# Patient Record
Sex: FEMALE | Race: BLACK OR AFRICAN AMERICAN | NOT HISPANIC OR LATINO | Employment: FULL TIME | RURAL
[De-identification: names, ages, dates, MRNs, and addresses within clinical notes are randomized per-mention and may not be internally consistent; named-entity substitution may affect disease eponyms.]

---

## 2017-01-16 ENCOUNTER — HISTORICAL (OUTPATIENT)
Dept: ADMINISTRATIVE | Facility: HOSPITAL | Age: 42
End: 2017-01-16

## 2017-01-18 LAB
LAB AP CLINICAL INFORMATION: NORMAL
LAB AP GENERAL CAT - HISTORICAL: NORMAL
LAB AP INTERPRETATION/RESULT - HISTORICAL: NEGATIVE
LAB AP SPECIMEN ADEQUACY - HISTORICAL: NORMAL
LAB AP SPECIMEN SUBMITTED - HISTORICAL: NORMAL

## 2017-04-13 ENCOUNTER — HISTORICAL (OUTPATIENT)
Dept: ADMINISTRATIVE | Facility: HOSPITAL | Age: 42
End: 2017-04-13

## 2017-04-14 LAB
LAB AP CLINICAL INFORMATION: NORMAL
LAB AP DIAGNOSIS - HISTORICAL: NORMAL
LAB AP GROSS PATHOLOGY - HISTORICAL: NORMAL
LAB AP SPECIMEN SUBMITTED - HISTORICAL: NORMAL

## 2021-06-28 ENCOUNTER — OFFICE VISIT (OUTPATIENT)
Dept: FAMILY MEDICINE | Facility: CLINIC | Age: 46
End: 2021-06-28
Payer: COMMERCIAL

## 2021-06-28 VITALS
BODY MASS INDEX: 35.79 KG/M2 | WEIGHT: 228 LBS | OXYGEN SATURATION: 100 % | DIASTOLIC BLOOD PRESSURE: 105 MMHG | HEART RATE: 91 BPM | RESPIRATION RATE: 17 BRPM | TEMPERATURE: 98 F | HEIGHT: 67 IN | SYSTOLIC BLOOD PRESSURE: 147 MMHG

## 2021-06-28 DIAGNOSIS — J01.00 ACUTE NON-RECURRENT MAXILLARY SINUSITIS: ICD-10-CM

## 2021-06-28 DIAGNOSIS — J01.11 ACUTE RECURRENT FRONTAL SINUSITIS: Primary | ICD-10-CM

## 2021-06-28 PROCEDURE — 99212 PR OFFICE/OUTPT VISIT, EST, LEVL II, 10-19 MIN: ICD-10-PCS | Mod: 25,,, | Performed by: NURSE PRACTITIONER

## 2021-06-28 PROCEDURE — 96372 PR INJECTION,THERAP/PROPH/DIAG2ST, IM OR SUBCUT: ICD-10-PCS | Mod: ,,, | Performed by: NURSE PRACTITIONER

## 2021-06-28 PROCEDURE — 99051 MED SERV EVE/WKEND/HOLIDAY: CPT | Mod: ,,, | Performed by: NURSE PRACTITIONER

## 2021-06-28 PROCEDURE — 96372 THER/PROPH/DIAG INJ SC/IM: CPT | Mod: ,,, | Performed by: NURSE PRACTITIONER

## 2021-06-28 PROCEDURE — 99051 PR MEDICAL SERVICES, EVE/WKEND/HOLIDAY: ICD-10-PCS | Mod: ,,, | Performed by: NURSE PRACTITIONER

## 2021-06-28 PROCEDURE — 99212 OFFICE O/P EST SF 10 MIN: CPT | Mod: 25,,, | Performed by: NURSE PRACTITIONER

## 2021-06-28 RX ORDER — CARVEDILOL 12.5 MG/1
TABLET ORAL
COMMUNITY
Start: 2021-03-08

## 2021-06-28 RX ORDER — ASPIRIN 81 MG/1
TABLET ORAL
COMMUNITY

## 2021-06-28 RX ORDER — ROSUVASTATIN CALCIUM 20 MG/1
TABLET, COATED ORAL
COMMUNITY
Start: 2021-05-18

## 2021-06-28 RX ORDER — FLUCONAZOLE 150 MG/1
TABLET ORAL
Qty: 1 TABLET | Refills: 0 | Status: SHIPPED | OUTPATIENT
Start: 2021-06-28 | End: 2023-08-02 | Stop reason: ALTCHOICE

## 2021-06-28 RX ORDER — PROMETHAZINE HYDROCHLORIDE AND DEXTROMETHORPHAN HYDROBROMIDE 6.25; 15 MG/5ML; MG/5ML
5 SYRUP ORAL EVERY 4 HOURS PRN
Qty: 150 ML | Refills: 0 | Status: SHIPPED | OUTPATIENT
Start: 2021-06-28 | End: 2021-07-08

## 2021-06-28 RX ORDER — TRIAMTERENE AND HYDROCHLOROTHIAZIDE 75; 50 MG/1; MG/1
TABLET ORAL
COMMUNITY

## 2021-06-28 RX ORDER — LEVOTHYROXINE SODIUM 125 UG/1
137 TABLET ORAL DAILY
COMMUNITY
Start: 2021-06-28 | End: 2023-08-02

## 2021-06-28 RX ORDER — KETOROLAC TROMETHAMINE 30 MG/ML
60 INJECTION, SOLUTION INTRAMUSCULAR; INTRAVENOUS
Status: COMPLETED | OUTPATIENT
Start: 2021-06-28 | End: 2021-06-28

## 2021-06-28 RX ORDER — PREDNISONE 20 MG/1
TABLET ORAL
Qty: 9 TABLET | Refills: 0 | Status: SHIPPED | OUTPATIENT
Start: 2021-06-28 | End: 2021-07-04

## 2021-06-28 RX ORDER — AZITHROMYCIN 500 MG/1
500 TABLET, FILM COATED ORAL DAILY
Qty: 5 TABLET | Refills: 0 | Status: SHIPPED | OUTPATIENT
Start: 2021-06-28 | End: 2023-08-02

## 2021-06-28 RX ADMIN — KETOROLAC TROMETHAMINE 60 MG: 30 INJECTION, SOLUTION INTRAMUSCULAR; INTRAVENOUS at 07:06

## 2021-08-17 ENCOUNTER — OFFICE VISIT (OUTPATIENT)
Dept: SURGERY | Facility: CLINIC | Age: 46
End: 2021-08-17
Attending: SURGERY
Payer: COMMERCIAL

## 2021-08-17 VITALS — HEIGHT: 67 IN | BODY MASS INDEX: 40.81 KG/M2 | WEIGHT: 260 LBS

## 2021-08-17 DIAGNOSIS — R10.10 PAIN OF UPPER ABDOMEN: ICD-10-CM

## 2021-08-17 DIAGNOSIS — R10.32 LEFT LOWER QUADRANT ABDOMINAL PAIN: Primary | ICD-10-CM

## 2021-08-17 PROCEDURE — 99204 PR OFFICE/OUTPT VISIT, NEW, LEVL IV, 45-59 MIN: ICD-10-PCS | Mod: S$PBB,,, | Performed by: SURGERY

## 2021-08-17 PROCEDURE — 99204 OFFICE O/P NEW MOD 45 MIN: CPT | Mod: S$PBB,,, | Performed by: SURGERY

## 2021-08-17 PROCEDURE — 99214 OFFICE O/P EST MOD 30 MIN: CPT | Mod: PBBFAC | Performed by: SURGERY

## 2021-08-17 PROCEDURE — 1159F PR MEDICATION LIST DOCUMENTED IN MEDICAL RECORD: ICD-10-PCS | Mod: ,,, | Performed by: SURGERY

## 2021-08-17 PROCEDURE — 3008F PR BODY MASS INDEX (BMI) DOCUMENTED: ICD-10-PCS | Mod: ,,, | Performed by: SURGERY

## 2021-08-17 PROCEDURE — 3008F BODY MASS INDEX DOCD: CPT | Mod: ,,, | Performed by: SURGERY

## 2021-08-17 PROCEDURE — 1159F MED LIST DOCD IN RCRD: CPT | Mod: ,,, | Performed by: SURGERY

## 2021-08-19 PROBLEM — R10.10 PAIN OF UPPER ABDOMEN: Status: ACTIVE | Noted: 2021-08-19

## 2021-08-19 PROBLEM — R10.32 LEFT LOWER QUADRANT ABDOMINAL PAIN: Status: ACTIVE | Noted: 2021-08-19

## 2021-08-25 ENCOUNTER — HOSPITAL ENCOUNTER (OUTPATIENT)
Dept: RADIOLOGY | Facility: HOSPITAL | Age: 46
Discharge: HOME | End: 2021-08-25
Attending: SURGERY
Payer: COMMERCIAL

## 2021-08-25 DIAGNOSIS — R10.10 PAIN OF UPPER ABDOMEN: ICD-10-CM

## 2021-08-25 PROCEDURE — 74177 CT ABD & PELVIS W/CONTRAST: CPT | Mod: TC

## 2021-08-25 PROCEDURE — 74177 CT ABD & PELVIS W/CONTRAST: CPT | Mod: 26,,, | Performed by: RADIOLOGY

## 2021-08-25 PROCEDURE — 25500020 PHARM REV CODE 255: Performed by: SURGERY

## 2021-08-25 PROCEDURE — 74177 CT ABDOMEN PELVIS WITH CONTRAST: ICD-10-PCS | Mod: 26,,, | Performed by: RADIOLOGY

## 2021-08-25 RX ADMIN — IOPAMIDOL 100 ML: 755 INJECTION, SOLUTION INTRAVENOUS at 10:08

## 2021-09-07 ENCOUNTER — OFFICE VISIT (OUTPATIENT)
Dept: SURGERY | Facility: CLINIC | Age: 46
End: 2021-09-07
Attending: SURGERY
Payer: COMMERCIAL

## 2021-09-07 DIAGNOSIS — R10.9 ABDOMINAL PAIN, UNSPECIFIED ABDOMINAL LOCATION: Primary | ICD-10-CM

## 2021-09-07 DIAGNOSIS — R10.11 RIGHT UPPER QUADRANT ABDOMINAL PAIN: ICD-10-CM

## 2021-09-07 DIAGNOSIS — K40.90 INGUINAL HERNIA OF RIGHT SIDE WITHOUT OBSTRUCTION OR GANGRENE: ICD-10-CM

## 2021-09-07 PROCEDURE — 1159F PR MEDICATION LIST DOCUMENTED IN MEDICAL RECORD: ICD-10-PCS | Mod: ,,, | Performed by: SURGERY

## 2021-09-07 PROCEDURE — 99213 PR OFFICE/OUTPT VISIT, EST, LEVL III, 20-29 MIN: ICD-10-PCS | Mod: S$PBB,,, | Performed by: SURGERY

## 2021-09-07 PROCEDURE — 99213 OFFICE O/P EST LOW 20 MIN: CPT | Mod: S$PBB,,, | Performed by: SURGERY

## 2021-09-07 PROCEDURE — 99213 OFFICE O/P EST LOW 20 MIN: CPT | Mod: PBBFAC | Performed by: SURGERY

## 2021-09-07 PROCEDURE — 1159F MED LIST DOCD IN RCRD: CPT | Mod: ,,, | Performed by: SURGERY

## 2021-09-08 PROBLEM — R10.9 ABDOMINAL PAIN: Status: ACTIVE | Noted: 2021-09-08

## 2021-09-08 PROBLEM — R10.11 RIGHT UPPER QUADRANT ABDOMINAL PAIN: Status: ACTIVE | Noted: 2021-08-19

## 2021-09-08 PROBLEM — K40.90 INGUINAL HERNIA OF RIGHT SIDE WITHOUT OBSTRUCTION OR GANGRENE: Status: ACTIVE | Noted: 2021-09-08

## 2021-09-14 ENCOUNTER — HOSPITAL ENCOUNTER (EMERGENCY)
Facility: HOSPITAL | Age: 46
Discharge: HOME | End: 2021-09-14
Attending: FAMILY MEDICINE | Admitting: FAMILY MEDICINE
Payer: COMMERCIAL

## 2021-09-14 VITALS
HEIGHT: 67 IN | SYSTOLIC BLOOD PRESSURE: 145 MMHG | WEIGHT: 260 LBS | RESPIRATION RATE: 22 BRPM | TEMPERATURE: 99 F | HEART RATE: 101 BPM | BODY MASS INDEX: 40.81 KG/M2 | OXYGEN SATURATION: 98 % | DIASTOLIC BLOOD PRESSURE: 87 MMHG

## 2021-09-14 DIAGNOSIS — J06.9 UPPER RESPIRATORY TRACT INFECTION, UNSPECIFIED TYPE: Primary | ICD-10-CM

## 2021-09-14 DIAGNOSIS — R07.9 CHEST PAIN: ICD-10-CM

## 2021-09-14 PROCEDURE — 99284 EMERGENCY DEPT VISIT MOD MDM: CPT | Mod: 25

## 2021-09-14 PROCEDURE — 93010 ELECTROCARDIOGRAM REPORT: CPT | Mod: ,,, | Performed by: FAMILY MEDICINE

## 2021-09-14 PROCEDURE — 99283 EMERGENCY DEPT VISIT LOW MDM: CPT | Mod: ,,, | Performed by: FAMILY MEDICINE

## 2021-09-14 PROCEDURE — 63600175 PHARM REV CODE 636 W HCPCS: Performed by: FAMILY MEDICINE

## 2021-09-14 PROCEDURE — 99283 PR EMERGENCY DEPT VISIT,LEVEL III: ICD-10-PCS | Mod: ,,, | Performed by: FAMILY MEDICINE

## 2021-09-14 PROCEDURE — 93005 ELECTROCARDIOGRAM TRACING: CPT

## 2021-09-14 PROCEDURE — 96372 THER/PROPH/DIAG INJ SC/IM: CPT

## 2021-09-14 PROCEDURE — 93010 EKG 12-LEAD: ICD-10-PCS | Mod: ,,, | Performed by: FAMILY MEDICINE

## 2021-09-14 RX ORDER — HYDROXYZINE PAMOATE 25 MG/1
25 CAPSULE ORAL EVERY 8 HOURS PRN
Qty: 40 CAPSULE | Refills: 0 | Status: SHIPPED | OUTPATIENT
Start: 2021-09-14 | End: 2022-09-06

## 2021-09-14 RX ORDER — BENZONATATE 100 MG/1
100 CAPSULE ORAL 3 TIMES DAILY PRN
Qty: 20 CAPSULE | Refills: 0 | Status: SHIPPED | OUTPATIENT
Start: 2021-09-14 | End: 2021-09-24

## 2021-09-14 RX ORDER — LORAZEPAM 2 MG/ML
INJECTION INTRAMUSCULAR
Status: DISCONTINUED
Start: 2021-09-14 | End: 2021-09-14 | Stop reason: HOSPADM

## 2021-09-14 RX ORDER — LORAZEPAM 2 MG/ML
1 INJECTION INTRAMUSCULAR
Status: COMPLETED | OUTPATIENT
Start: 2021-09-14 | End: 2021-09-14

## 2021-09-14 RX ADMIN — LORAZEPAM 1 MG: 2 INJECTION INTRAMUSCULAR; INTRAVENOUS at 04:09

## 2021-09-15 ENCOUNTER — TELEPHONE (OUTPATIENT)
Dept: EMERGENCY MEDICINE | Facility: HOSPITAL | Age: 46
End: 2021-09-15

## 2022-04-21 DIAGNOSIS — M54.41 LOW BACK PAIN WITH RIGHT-SIDED SCIATICA, UNSPECIFIED BACK PAIN LATERALITY, UNSPECIFIED CHRONICITY: Primary | ICD-10-CM

## 2022-04-26 ENCOUNTER — CLINICAL SUPPORT (OUTPATIENT)
Dept: REHABILITATION | Facility: HOSPITAL | Age: 47
End: 2022-04-26
Payer: COMMERCIAL

## 2022-04-26 DIAGNOSIS — M54.6 CHRONIC RIGHT-SIDED THORACIC BACK PAIN: Primary | ICD-10-CM

## 2022-04-26 DIAGNOSIS — M54.41 LOW BACK PAIN WITH RIGHT-SIDED SCIATICA, UNSPECIFIED BACK PAIN LATERALITY, UNSPECIFIED CHRONICITY: ICD-10-CM

## 2022-04-26 DIAGNOSIS — G89.29 CHRONIC RIGHT-SIDED THORACIC BACK PAIN: Primary | ICD-10-CM

## 2022-04-26 PROCEDURE — 97161 PT EVAL LOW COMPLEX 20 MIN: CPT

## 2022-04-26 NOTE — PLAN OF CARE
[facility]   Physical Therapy Initial Evaluation    Name: Rox Comer  Clinic Number: [id number]    Therapy Diagnosis:   Encounter Diagnoses   Name Primary?    Low back pain with right-sided pain     Chronic right-sided thoracic back pain Yes     Physician: Augusto Jung DO    Physician Orders: PT Eval and Treat   Medical Diagnosis from Referral: Low back pain with right-sided pain  Evaluation Date: 4/26/2022  Plan of Care Expiration: 5/27/22  Visit # / Visits authorized: 1/ 12    Time In: 1625  Time Out: 1645    Precautions: Standard    Subjective   Date of onset: January 2022  History of current condition - Kerwin reports: having some right side back pain in January following a session of golf practice. The pain progressively got worse and she finally had to see a doctor about it in February and then returned in March because the pain has not resolved. Patient is taking Zanaflex for muscle relaxer. Dr. Jung referred patient to therapy because the pain has become chronic and unresolved.     Medical History:   Past Medical History:   Diagnosis Date    Hypertension     Hyperthyroidism        Surgical History:   Rox Comer  has a past surgical history that includes Hernia repair and Thyroid surgery (09/2020).    Medications:   Rox has a current medication list which includes the following prescription(s): aspirin, azithromycin, carvedilol, fluconazole, hydroxyzine pamoate, levothyroxine, rosuvastatin, and triamterene-hydrochlorothiazide 75-50 mg.    Allergies:   Review of patient's allergies indicates:   Allergen Reactions    Cephalexin monohydrate Rash        Imaging, none available:     Prior Therapy: no  Social History:  lives with their spouse  Occupation:   Prior Level of Function: independent  Current Level of Function: independent    Pain:  Current 3/10, worst 10/10, best 3/10   Location: right thoracic back   Description: Aching  Aggravating Factors:  laying to sitting position  Easing Factors: muscle relaxers    Pts goals: for the pain to be gone.      Objective     I. Supine Tests:    LLD Neg right Neg left       Special test Right  Left    SLR test < 60 degrees Negative Negative   SLR test > 60 degrees Negative Negative   Piriformis test Negative Negative   ELVIN test Negative Negative   SI distraction Negative Negative   SI compression Negative Negative          MANUAL MUSCLE TEST  Right left   Hip flexion MMT number: 5/5 MMT strength: 5/5   Hip abduction MMT strength: 5/5 MMT strength: 5/5   Knee extension MMT strength: 5/5 MMT strength: 5/5   Knee flexion  MMT strength: 5/5 MMT strength: 5/5   Ankle dorsiflexion MMT strength: 5/5 MMT strength: 5/5   Ankle plantar flexion  MMT strength: 5/5 MMT strength: 5/5   Extensor hallucis longus MMT strength: 5/5 MMT strength: 5/5     ROM/flexibility right left   Hip flexion (120) WFL WFL   Internal rotation (45) WFL WFL   External rotation (45) WFL WFL   Hamstring 90/90 (-10) WFL WFL   Rectus femoris (120) WFL WFL               Abdominal MMT: 3-/5  Back/Thoaracid extensors MMT: 3-/5    I. Sitting Tests:    Palpation: tender to palpation right thoracic lateral and posterior intercostals    Sitting lordosis: WNL  Sitting slump test Negative RIGHT  Negative LEFT       III. Standing Tests:    Posture:  1. Standing lordosis: WNL  2. Scoliosis: no  3. Lateral shift: n0    SI forward bend Negative right Negative left     SPINE AROM:    Lumbar Flexion: WNL  Lumbar Extension: WNL  Lumbar Side Bending: right 10 left 25  Trunk rotation: right 40 left 60      IV. Gait assessment:     Step length:  WNL  Step width: WNL   Dorita:  WNL  Antalgic gait: no  Assistive device: none             Assessment     Pt prognosis is Good.     Pt will benefit from skilled outpatient Physical Therapy to address the deficits stated above and in the chart below, provide pt/family education, and to maximize pt's level of independence.     Plan of  care discussed with patient: Yes  Pt's spiritual, cultural and educational needs considered and patient is agreeable to the plan of care and goals as stated below:     Anticipated Barriers for therapy: NONE      Goals:  Short Term Goals: 2 weeks   Pt in agreement with goals to improve independent functional mobility and activity tolerance.    1. Patient will correctly demonstrate independent performance of Home Exercise Program in 1 week.  2. Patient will report decreased right thoracic intercostal pain to 5/10 for standing, walking and bending activities.      Long Term Goals: 4 weeks   1. Patient will increase bilateral hip LE strength and abdominal strength to 3+/5 to improve functional activity tolerance.  2. Patient will increase abdominal and back extensor strength to 4/5 to improve functional mobility for recreational activities.  3. Patient will tolerate 30 minutes or greater of therapeutic exercise with back pain no greater than 0/10 to improve functional activity tolerance.      Plan   Plan of care Certification: 4/26/2022 to 5/27/22.    Outpatient Physical Therapy 3 times weekly for 4 weeks to include the following interventions: Electrical Stimulation IFC, Manual Therapy, Moist Heat/ Ice, Patient Education and Therapeutic Exercise.     Supervised visit: Case conference with Essence Shaw PTA and POC reviewed.    Grant Rosales, PT      Physician Signature:________________________________________________      Date:____________________________________________________________

## 2022-09-06 ENCOUNTER — OFFICE VISIT (OUTPATIENT)
Dept: OBSTETRICS AND GYNECOLOGY | Facility: CLINIC | Age: 47
End: 2022-09-06
Payer: COMMERCIAL

## 2022-09-06 VITALS
HEIGHT: 67 IN | RESPIRATION RATE: 16 BRPM | SYSTOLIC BLOOD PRESSURE: 130 MMHG | BODY MASS INDEX: 42.85 KG/M2 | WEIGHT: 273 LBS | DIASTOLIC BLOOD PRESSURE: 80 MMHG

## 2022-09-06 DIAGNOSIS — Z01.419 NORMAL GYNECOLOGIC EXAMINATION: Primary | ICD-10-CM

## 2022-09-06 PROBLEM — M54.6 RIGHT-SIDED THORACIC BACK PAIN: Status: RESOLVED | Noted: 2022-04-26 | Resolved: 2022-09-06

## 2022-09-06 PROBLEM — R10.11 RIGHT UPPER QUADRANT ABDOMINAL PAIN: Status: RESOLVED | Noted: 2021-08-19 | Resolved: 2022-09-06

## 2022-09-06 PROBLEM — R10.32 LEFT LOWER QUADRANT ABDOMINAL PAIN: Status: RESOLVED | Noted: 2021-08-19 | Resolved: 2022-09-06

## 2022-09-06 PROBLEM — R10.9 ABDOMINAL PAIN: Status: RESOLVED | Noted: 2021-09-08 | Resolved: 2022-09-06

## 2022-09-06 PROCEDURE — 99213 OFFICE O/P EST LOW 20 MIN: CPT | Mod: PBBFAC | Performed by: ADVANCED PRACTICE MIDWIFE

## 2022-09-06 RX ORDER — VALSARTAN 80 MG/1
TABLET ORAL
COMMUNITY
Start: 2022-08-10

## 2022-09-06 RX ORDER — DICLOFENAC SODIUM 75 MG/1
TABLET, DELAYED RELEASE ORAL
COMMUNITY

## 2022-09-06 NOTE — PROGRESS NOTES
Subjective:      Rox Comer is a 47 y.o. female who presents for an annual exam.     She reports her periods are:  spotting in august,  light bleeding x 3 days approx every 3-4 months    Complains of vaginitis No    Hot Flashes: occasionally during the day, regular night sweats    Colonoscopy Due: n/a,  Last MMG 9/2021 @ [facility]    Last Dexa: N/A    Review of Systems  Pertinent items are noted in HPI.      Objective:      {exam; complete:01730}.           Assessment:     No diagnosis found.       Plan:       All questions answered.  Breast self exam technique reviewed and patient encouraged to perform self-exam monthly.  Discussed healthy lifestyle modifications.        No orders of the defined types were placed in this encounter.

## 2022-09-06 NOTE — PROGRESS NOTES
Subjective:       Patient ID: Rox Comer is a 47 y.o. female.    Chief Complaint: Well Woman (Last pap 2021) and Gynecologic Exam    Complains of vaginitis No    Hot Flashes: occasionally during the day, regular night sweats    Colonoscopy Due: n/a,  Last MMG 9/2021 @ [facility]      Gynecologic Exam  The patient's primary symptoms include pelvic pain (Occasional pain left inguinal area). She is not pregnant. She is sexually active. No, her partner does not have an STD. Her menstrual history has been irregular (Periods are approx every 3-4 months, light bleeding x 3 days, LMP August 2022).   Review of Systems   Constitutional: Negative.    HENT: Negative.     Eyes: Negative.    Respiratory: Negative.     Cardiovascular: Negative.    Gastrointestinal: Negative.    Endocrine: Negative.    Genitourinary:  Positive for pelvic pain (Occasional pain left inguinal area).   Musculoskeletal: Negative.    Integumentary:  Negative.   Neurological: Negative.    Psychiatric/Behavioral: Negative.         Objective:      Physical Exam  Vitals reviewed.   Constitutional:       Appearance: She is obese.   HENT:      Head: Normocephalic.   Cardiovascular:      Rate and Rhythm: Normal rate and regular rhythm.   Pulmonary:      Effort: Pulmonary effort is normal.      Breath sounds: Normal breath sounds.   Chest:   Breasts:     Breasts are symmetrical.      Right: Normal. No mass.      Left: Normal. No mass.   Abdominal:      General: Abdomen is flat.      Palpations: Abdomen is soft.      Hernia: There is no hernia in the left inguinal area or right inguinal area.   Genitourinary:     General: Normal vulva.      Exam position: Lithotomy position.      Vagina: Normal.      Cervix: No cervical motion tenderness.      Uterus: Normal. Not tender.       Adnexa: Right adnexa normal and left adnexa normal.        Right: No mass.          Left: No mass.        Rectum: Normal. No mass.   Musculoskeletal:         General:  Normal range of motion.      Cervical back: Normal range of motion.   Lymphadenopathy:      Lower Body: No right inguinal adenopathy. No left inguinal adenopathy.   Skin:     General: Skin is warm and dry.   Neurological:      Mental Status: She is alert and oriented to person, place, and time.   Psychiatric:         Mood and Affect: Mood normal.         Behavior: Behavior normal.       Assessment:       Problem List Items Addressed This Visit    None  Visit Diagnoses       Normal gynecologic examination    -  Primary              Plan:       F/U with Surgeon if Inguinal pain becomes more consistent or worsens.   AE in 1 year

## 2023-08-02 ENCOUNTER — OFFICE VISIT (OUTPATIENT)
Dept: FAMILY MEDICINE | Facility: CLINIC | Age: 48
End: 2023-08-02
Payer: COMMERCIAL

## 2023-08-02 VITALS
DIASTOLIC BLOOD PRESSURE: 83 MMHG | TEMPERATURE: 98 F | OXYGEN SATURATION: 96 % | RESPIRATION RATE: 18 BRPM | WEIGHT: 272 LBS | BODY MASS INDEX: 42.69 KG/M2 | HEART RATE: 80 BPM | SYSTOLIC BLOOD PRESSURE: 136 MMHG | HEIGHT: 67 IN

## 2023-08-02 DIAGNOSIS — R05.1 ACUTE COUGH: ICD-10-CM

## 2023-08-02 DIAGNOSIS — J01.00 ACUTE NON-RECURRENT MAXILLARY SINUSITIS: Primary | ICD-10-CM

## 2023-08-02 PROBLEM — K40.90 INGUINAL HERNIA OF RIGHT SIDE WITHOUT OBSTRUCTION OR GANGRENE: Status: RESOLVED | Noted: 2021-09-08 | Resolved: 2023-08-02

## 2023-08-02 PROCEDURE — 3008F PR BODY MASS INDEX (BMI) DOCUMENTED: ICD-10-PCS | Mod: ICN,,, | Performed by: FAMILY MEDICINE

## 2023-08-02 PROCEDURE — 1159F PR MEDICATION LIST DOCUMENTED IN MEDICAL RECORD: ICD-10-PCS | Mod: ICN,,, | Performed by: FAMILY MEDICINE

## 2023-08-02 PROCEDURE — 3075F PR MOST RECENT SYSTOLIC BLOOD PRESS GE 130-139MM HG: ICD-10-PCS | Mod: ICN,,, | Performed by: FAMILY MEDICINE

## 2023-08-02 PROCEDURE — 3079F PR MOST RECENT DIASTOLIC BLOOD PRESSURE 80-89 MM HG: ICD-10-PCS | Mod: ICN,,, | Performed by: FAMILY MEDICINE

## 2023-08-02 PROCEDURE — 3079F DIAST BP 80-89 MM HG: CPT | Mod: ICN,,, | Performed by: FAMILY MEDICINE

## 2023-08-02 PROCEDURE — 99214 PR OFFICE/OUTPT VISIT, EST, LEVL IV, 30-39 MIN: ICD-10-PCS | Mod: 25,ICN,, | Performed by: FAMILY MEDICINE

## 2023-08-02 PROCEDURE — 3008F BODY MASS INDEX DOCD: CPT | Mod: ICN,,, | Performed by: FAMILY MEDICINE

## 2023-08-02 PROCEDURE — 1160F PR REVIEW ALL MEDS BY PRESCRIBER/CLIN PHARMACIST DOCUMENTED: ICD-10-PCS | Mod: ICN,,, | Performed by: FAMILY MEDICINE

## 2023-08-02 PROCEDURE — 96372 PR INJECTION,THERAP/PROPH/DIAG2ST, IM OR SUBCUT: ICD-10-PCS | Mod: ICN,,, | Performed by: FAMILY MEDICINE

## 2023-08-02 PROCEDURE — 4010F PR ACE/ARB THEARPY RXD/TAKEN: ICD-10-PCS | Mod: ICN,,, | Performed by: FAMILY MEDICINE

## 2023-08-02 PROCEDURE — 99214 OFFICE O/P EST MOD 30 MIN: CPT | Mod: 25,ICN,, | Performed by: FAMILY MEDICINE

## 2023-08-02 PROCEDURE — 96372 THER/PROPH/DIAG INJ SC/IM: CPT | Mod: ICN,,, | Performed by: FAMILY MEDICINE

## 2023-08-02 PROCEDURE — 3075F SYST BP GE 130 - 139MM HG: CPT | Mod: ICN,,, | Performed by: FAMILY MEDICINE

## 2023-08-02 PROCEDURE — 4010F ACE/ARB THERAPY RXD/TAKEN: CPT | Mod: ICN,,, | Performed by: FAMILY MEDICINE

## 2023-08-02 PROCEDURE — 1159F MED LIST DOCD IN RCRD: CPT | Mod: ICN,,, | Performed by: FAMILY MEDICINE

## 2023-08-02 PROCEDURE — 1160F RVW MEDS BY RX/DR IN RCRD: CPT | Mod: ICN,,, | Performed by: FAMILY MEDICINE

## 2023-08-02 RX ORDER — FLUCONAZOLE 150 MG/1
TABLET ORAL
Qty: 3 TABLET | Refills: 0 | Status: SHIPPED | OUTPATIENT
Start: 2023-08-02 | End: 2023-12-07 | Stop reason: ALTCHOICE

## 2023-08-02 RX ORDER — CHLORPHENIRAMINE MALEATE, DEXTROMETHORPHAN HYDROBROMIDE, AND PHENYLEPHRINE HYDROCHLORIDE 4; 10; 10 MG/1; MG/1; MG/1
1 TABLET, COATED ORAL EVERY 8 HOURS PRN
Qty: 30 TABLET | Refills: 0 | Status: SHIPPED | OUTPATIENT
Start: 2023-08-02 | End: 2023-12-07

## 2023-08-02 RX ORDER — LEVOTHYROXINE SODIUM 137 UG/1
1 TABLET ORAL DAILY
COMMUNITY
Start: 2023-07-22 | End: 2023-12-07

## 2023-08-02 RX ORDER — DEXAMETHASONE SODIUM PHOSPHATE 4 MG/ML
4 INJECTION, SOLUTION INTRA-ARTICULAR; INTRALESIONAL; INTRAMUSCULAR; INTRAVENOUS; SOFT TISSUE
Status: COMPLETED | OUTPATIENT
Start: 2023-08-02 | End: 2023-08-02

## 2023-08-02 RX ORDER — AMOXICILLIN AND CLAVULANATE POTASSIUM 875; 125 MG/1; MG/1
1 TABLET, FILM COATED ORAL EVERY 12 HOURS
Qty: 20 TABLET | Refills: 0 | Status: SHIPPED | OUTPATIENT
Start: 2023-08-02 | End: 2023-08-12

## 2023-08-02 RX ORDER — METHYLPREDNISOLONE ACETATE 40 MG/ML
40 INJECTION, SUSPENSION INTRA-ARTICULAR; INTRALESIONAL; INTRAMUSCULAR; SOFT TISSUE
Status: COMPLETED | OUTPATIENT
Start: 2023-08-02 | End: 2023-08-02

## 2023-08-02 RX ADMIN — METHYLPREDNISOLONE ACETATE 40 MG: 40 INJECTION, SUSPENSION INTRA-ARTICULAR; INTRALESIONAL; INTRAMUSCULAR; SOFT TISSUE at 04:08

## 2023-08-02 RX ADMIN — DEXAMETHASONE SODIUM PHOSPHATE 4 MG: 4 INJECTION, SOLUTION INTRA-ARTICULAR; INTRALESIONAL; INTRAMUSCULAR; INTRAVENOUS; SOFT TISSUE at 04:08

## 2023-08-02 NOTE — PROGRESS NOTES
Clinic Note    Patient Name: Rox Comer  : 1975  MRN: [id number]    Chief Complaint   Patient presents with    Sinus Problem     Head congestion and cough x 1 week        HPI:    Ms. Rox Comer is a 47 y.o. female who presents to clinic today with CC of cough and congestion X 1 week.   Patient reports green/yellow thick mucus.  Patient denies fever.  Patient is, otherwise, without complaints.     Medications:  Medication List with Changes/Refills   New Medications    AMOXICILLIN-CLAVULANATE 875-125MG (AUGMENTIN) 875-125 MG PER TABLET    Take 1 tablet by mouth every 12 (twelve) hours. for 10 days    CHLORPHENIRAMINE-PHENYLEPH-DM (ED A-HIST DM) 4-10-10 MG TAB    Take 1 tablet by mouth every 8 (eight) hours as needed (congestion or cough).    FLUCONAZOLE (DIFLUCAN) 150 MG TAB    Take one tablet by mouth every 72 hours X 3 doses if needed for yeast infection after completion of antibiotics.   Current Medications    ASPIRIN (ECOTRIN) 81 MG EC TABLET    Take by mouth.    CALCIUM CARBONATE/VITAMIN D3 (VITAMIN D-3 ORAL)    1 capsule    CARVEDILOL (COREG) 12.5 MG TABLET        DICLOFENAC (VOLTAREN) 75 MG EC TABLET    1 tablet with food or milk    ROSUVASTATIN (CRESTOR) 20 MG TABLET        SYNTHROID 137 MCG TAB TABLET    Take 1 tablet by mouth once daily.    TRIAMTERENE-HYDROCHLOROTHIAZIDE 75-50 MG (MAXZIDE) 75-50 MG PER TABLET    Take by mouth.    VALSARTAN (DIOVAN) 80 MG TABLET       Discontinued Medications    AZITHROMYCIN (ZITHROMAX) 500 MG TABLET    Take 1 tablet (500 mg total) by mouth once daily.    FLUCONAZOLE (DIFLUCAN) 150 MG TAB    Take 1 po and repeat in 5 days PRN vaginal itching after antibiotics.    LEVOTHYROXINE (SYNTHROID) 125 MCG TABLET    Take 137 mcg by mouth once daily at 6am.        Allergies: Cephalexin and Cephalexin monohydrate      Past Medical History:    Past Medical History:   Diagnosis Date    High cholesterol     Hypertension     Hyperthyroidism     Obesity, unspecified      "Sleep apnea, unspecified     Uses CPAP regularly       Past Surgical History:    Past Surgical History:   Procedure Laterality Date    HERNIA REPAIR      Left Inguinal, Sept 2019, Dr Acuña    KNEE SURGERY Left 2018    Miniscus Tear, Dr Blount    THYROID SURGERY  09/2020    total thyroidectomy, Dr Childress         Social History:    Social History     Tobacco Use   Smoking Status Never   Smokeless Tobacco Never     Social History     Substance and Sexual Activity   Alcohol Use Never     Social History     Substance and Sexual Activity   Drug Use Never         Family History:    Family History   Problem Relation Age of Onset    Diabetes Mother     Hypertension Mother     Heart disease Mother     Stroke Father     Cancer Father 69        Colon    Hypertension Father     Hypertension Sister     Cancer Other 50        Breast Cancer    Ovarian cancer Neg Hx        Review of Systems:    Review of Systems   Constitutional:  Negative for appetite change, chills, fatigue, fever and unexpected weight change.   HENT:  Positive for nasal congestion, postnasal drip and sinus pressure/congestion. Negative for ear pain and sore throat.    Eyes:  Negative for visual disturbance.   Respiratory:  Positive for cough. Negative for shortness of breath.    Cardiovascular:  Negative for chest pain and leg swelling.   Gastrointestinal:  Negative for abdominal pain, change in bowel habit, constipation, diarrhea, nausea, vomiting and change in bowel habit.   Musculoskeletal:  Negative for arthralgias.   Integumentary:  Negative for rash.   Neurological:  Negative for dizziness and headaches.   Psychiatric/Behavioral:  The patient is not nervous/anxious.         Vitals:    Vitals:    08/02/23 1532   BP: 136/83   BP Location: Left arm   Patient Position: Sitting   BP Method: Large (Automatic)   Pulse: 80   Resp: 18   Temp: 98.1 °F (36.7 °C)   TempSrc: Oral   SpO2: 96%   Weight: 123.4 kg (272 lb)   Height: 5' 7" (1.702 m)       Body mass index is " 42.6 kg/m².    Wt Readings from Last 3 Encounters:   08/02/23 1532 123.4 kg (272 lb)   09/06/22 1104 123.8 kg (273 lb)   09/14/21 1628 117.9 kg (260 lb)        Physical Exam:    Physical Exam  Constitutional:       General: She is not in acute distress.     Appearance: Normal appearance. She is obese.   HENT:      Nose: Congestion present.      Mouth/Throat:      Mouth: Mucous membranes are moist.      Pharynx: Oropharynx is clear.   Eyes:      Conjunctiva/sclera: Conjunctivae normal.   Cardiovascular:      Rate and Rhythm: Normal rate and regular rhythm.      Heart sounds: Normal heart sounds. No murmur heard.  Pulmonary:      Effort: Pulmonary effort is normal. No respiratory distress.      Breath sounds: Normal breath sounds. No wheezing, rhonchi or rales.   Abdominal:      General: Bowel sounds are normal.      Palpations: Abdomen is soft.      Tenderness: There is no abdominal tenderness.   Musculoskeletal:      Cervical back: Neck supple.      Right lower leg: No edema.      Left lower leg: No edema.   Skin:     Findings: No rash.   Neurological:      General: No focal deficit present.      Mental Status: She is alert. Mental status is at baseline.   Psychiatric:         Mood and Affect: Mood normal.         Results:  COVID-19: negative  Influenza A/B: negative  Rapid strep: negative    Assessment/Plan:   1. Acute non-recurrent maxillary sinusitis  -     dexAMETHasone injection 4 mg  -     methylPREDNISolone acetate injection 40 mg  -     amoxicillin-clavulanate 875-125mg (AUGMENTIN) 875-125 mg per tablet; Take 1 tablet by mouth every 12 (twelve) hours. for 10 days  Dispense: 20 tablet; Refill: 0  -     chlorpheniramine-phenyleph-DM (ED A-HIST DM) 4-10-10 mg Tab; Take 1 tablet by mouth every 8 (eight) hours as needed (congestion or cough).  Dispense: 30 tablet; Refill: 0  -     fluconazole (DIFLUCAN) 150 MG Tab; Take one tablet by mouth every 72 hours X 3 doses if needed for yeast infection after completion of  antibiotics.  Dispense: 3 tablet; Refill: 0  - Patient has an allergy to keflex. Reports she can take PCN with no issues.    2. Acute cough  -     SARS Coronavirus 2 Antigen, POCT  -     POCT rapid strep A  -     POCT Influenza A/B Rapid Antigen       RTC prn if symptoms worsen or fail to resolve.  Patient voiced understanding and is agreeable to plan.      Brisa Noble MD    Family Medicine

## 2023-08-03 ENCOUNTER — PATIENT MESSAGE (OUTPATIENT)
Dept: ADMINISTRATIVE | Facility: HOSPITAL | Age: 48
End: 2023-08-03

## 2023-08-03 ENCOUNTER — PATIENT OUTREACH (OUTPATIENT)
Dept: ADMINISTRATIVE | Facility: HOSPITAL | Age: 48
End: 2023-08-03

## 2023-08-03 NOTE — PROGRESS NOTES
Population Health review of encounter with date of service 8/2/23.  Does not qualify for HY or [facility].  Has HY scheduled for needs Pt needs appt for HY sent to PES to schedule via one note  Sent patient portal message regarding need for:  JESSE Puckett

## 2023-12-06 ENCOUNTER — TELEPHONE (OUTPATIENT)
Dept: OBSTETRICS AND GYNECOLOGY | Facility: CLINIC | Age: 48
End: 2023-12-06
Payer: COMMERCIAL

## 2023-12-06 ENCOUNTER — PATIENT MESSAGE (OUTPATIENT)
Dept: OBSTETRICS AND GYNECOLOGY | Facility: CLINIC | Age: 48
End: 2023-12-06
Payer: COMMERCIAL

## 2023-12-07 ENCOUNTER — TELEPHONE (OUTPATIENT)
Dept: FAMILY MEDICINE | Facility: CLINIC | Age: 48
End: 2023-12-07
Payer: COMMERCIAL

## 2023-12-07 ENCOUNTER — OFFICE VISIT (OUTPATIENT)
Dept: FAMILY MEDICINE | Facility: CLINIC | Age: 48
End: 2023-12-07
Payer: COMMERCIAL

## 2023-12-07 VITALS
HEART RATE: 90 BPM | DIASTOLIC BLOOD PRESSURE: 96 MMHG | OXYGEN SATURATION: 97 % | TEMPERATURE: 99 F | SYSTOLIC BLOOD PRESSURE: 150 MMHG | RESPIRATION RATE: 18 BRPM | BODY MASS INDEX: 41.78 KG/M2 | HEIGHT: 67 IN | WEIGHT: 266.19 LBS

## 2023-12-07 DIAGNOSIS — R05.1 ACUTE COUGH: ICD-10-CM

## 2023-12-07 DIAGNOSIS — J06.9 UPPER RESPIRATORY TRACT INFECTION, UNSPECIFIED TYPE: Primary | ICD-10-CM

## 2023-12-07 LAB
CTP QC/QA: YES
FLUAV AG NPH QL: NEGATIVE
FLUBV AG NPH QL: NEGATIVE

## 2023-12-07 PROCEDURE — 1159F MED LIST DOCD IN RCRD: CPT | Mod: ,,, | Performed by: NURSE PRACTITIONER

## 2023-12-07 PROCEDURE — 1160F RVW MEDS BY RX/DR IN RCRD: CPT | Mod: ,,, | Performed by: NURSE PRACTITIONER

## 2023-12-07 PROCEDURE — 3077F SYST BP >= 140 MM HG: CPT | Mod: ,,, | Performed by: NURSE PRACTITIONER

## 2023-12-07 PROCEDURE — 4010F ACE/ARB THERAPY RXD/TAKEN: CPT | Mod: ,,, | Performed by: NURSE PRACTITIONER

## 2023-12-07 PROCEDURE — 99213 OFFICE O/P EST LOW 20 MIN: CPT | Mod: ,,, | Performed by: NURSE PRACTITIONER

## 2023-12-07 PROCEDURE — 87804 INFLUENZA ASSAY W/OPTIC: CPT | Mod: 59,QW,, | Performed by: NURSE PRACTITIONER

## 2023-12-07 PROCEDURE — 87804 POCT INFLUENZA A/B: ICD-10-PCS | Mod: 59,QW,, | Performed by: NURSE PRACTITIONER

## 2023-12-07 PROCEDURE — 3008F BODY MASS INDEX DOCD: CPT | Mod: ,,, | Performed by: NURSE PRACTITIONER

## 2023-12-07 PROCEDURE — 3080F DIAST BP >= 90 MM HG: CPT | Mod: ,,, | Performed by: NURSE PRACTITIONER

## 2023-12-07 PROCEDURE — 3008F PR BODY MASS INDEX (BMI) DOCUMENTED: ICD-10-PCS | Mod: ,,, | Performed by: NURSE PRACTITIONER

## 2023-12-07 PROCEDURE — 99213 PR OFFICE/OUTPT VISIT, EST, LEVL III, 20-29 MIN: ICD-10-PCS | Mod: ,,, | Performed by: NURSE PRACTITIONER

## 2023-12-07 PROCEDURE — 1160F PR REVIEW ALL MEDS BY PRESCRIBER/CLIN PHARMACIST DOCUMENTED: ICD-10-PCS | Mod: ,,, | Performed by: NURSE PRACTITIONER

## 2023-12-07 PROCEDURE — 3077F PR MOST RECENT SYSTOLIC BLOOD PRESSURE >= 140 MM HG: ICD-10-PCS | Mod: ,,, | Performed by: NURSE PRACTITIONER

## 2023-12-07 PROCEDURE — 3080F PR MOST RECENT DIASTOLIC BLOOD PRESSURE >= 90 MM HG: ICD-10-PCS | Mod: ,,, | Performed by: NURSE PRACTITIONER

## 2023-12-07 PROCEDURE — 4010F PR ACE/ARB THEARPY RXD/TAKEN: ICD-10-PCS | Mod: ,,, | Performed by: NURSE PRACTITIONER

## 2023-12-07 PROCEDURE — 1159F PR MEDICATION LIST DOCUMENTED IN MEDICAL RECORD: ICD-10-PCS | Mod: ,,, | Performed by: NURSE PRACTITIONER

## 2023-12-07 RX ORDER — FLUCONAZOLE 150 MG/1
150 TABLET ORAL
Qty: 2 TABLET | Refills: 0 | Status: SHIPPED | OUTPATIENT
Start: 2023-12-07

## 2023-12-07 RX ORDER — LEVOTHYROXINE SODIUM 150 MCG
150 TABLET ORAL
COMMUNITY
Start: 2023-11-27

## 2023-12-07 RX ORDER — AMOXICILLIN AND CLAVULANATE POTASSIUM 875; 125 MG/1; MG/1
1 TABLET, FILM COATED ORAL EVERY 12 HOURS
Qty: 20 TABLET | Refills: 0 | Status: SHIPPED | OUTPATIENT
Start: 2023-12-07

## 2023-12-07 NOTE — PROGRESS NOTES
Clinic Note    Rox Comer is a 48 y.o. female     Chief Complaint:   Chief Complaint   Patient presents with    Nasal Congestion    Hoarse    Cough     Symptoms started x1 week. Progressively gotten worse. Coughing up yellow/green phlem.         Subjective:    Patient complains of cough, sore throat, headache, and sinus drainage. Symptoms X 1 week. Admits to hoarseness. Admits to productive cough with yellow sputum. Denies fever or body aches.   Patient reports bp typically well controlled. Did not take morning dose of bp med.    Cough  Associated symptoms include headaches, postnasal drip and a sore throat. Pertinent negatives include no chest pain, chills, ear pain, fever or shortness of breath.        Allergies:   Review of patient's allergies indicates:   Allergen Reactions    Cephalexin      Other reaction(s): Unknown    Cephalexin monohydrate Rash        Past Medical History:  Past Medical History:   Diagnosis Date    High cholesterol     Hypertension     Hyperthyroidism     Obesity, unspecified     Sleep apnea, unspecified     Uses CPAP regularly        Current Medications:    Current Outpatient Medications:     aspirin (ECOTRIN) 81 MG EC tablet, Take by mouth., Disp: , Rfl:     calcium carbonate/vitamin D3 (VITAMIN D-3 ORAL), 1 capsule, Disp: , Rfl:     carvediloL (COREG) 12.5 MG tablet, , Disp: , Rfl:     diclofenac (VOLTAREN) 75 MG EC tablet, 1 tablet with food or milk, Disp: , Rfl:     rosuvastatin (CRESTOR) 20 MG tablet, , Disp: , Rfl:     SYNTHROID 150 mcg tablet, Take 150 mcg by mouth., Disp: , Rfl:     triamterene-hydrochlorothiazide 75-50 mg (MAXZIDE) 75-50 mg per tablet, Take by mouth., Disp: , Rfl:     valsartan (DIOVAN) 80 MG tablet, , Disp: , Rfl:     amoxicillin-clavulanate 875-125mg (AUGMENTIN) 875-125 mg per tablet, Take 1 tablet by mouth every 12 (twelve) hours., Disp: 20 tablet, Rfl: 0    chlorpheniramine-phenyleph-DM 4-10-10 mg Tab, Take 1 tablet by mouth every 6 (six) hours as  "needed., Disp: 30 tablet, Rfl: 0    fluconazole (DIFLUCAN) 150 MG Tab, Take 1 tablet (150 mg total) by mouth every 72 hours as needed. May take after antibiotic complete. Repeat in 72 hr if needed, Disp: 2 tablet, Rfl: 0       Review of Systems   Constitutional:  Negative for chills and fever.   HENT:  Positive for nasal congestion, postnasal drip, sinus pressure/congestion, sore throat and voice change. Negative for ear discharge and ear pain.    Respiratory:  Positive for cough. Negative for shortness of breath.    Cardiovascular:  Negative for chest pain.   Gastrointestinal:  Negative for abdominal pain.   Neurological:  Positive for headaches.          Objective:    BP (!) 150/96 (BP Location: Left arm, Patient Position: Sitting, BP Method: Large (Manual))   Pulse 90   Temp 98.7 °F (37.1 °C) (Oral)   Resp 18   Ht 5' 7" (1.702 m)   Wt 120.7 kg (266 lb 3.2 oz)   SpO2 97%   BMI 41.69 kg/m²      Physical Exam  Constitutional:       Appearance: She is obese.   HENT:      Nose: Congestion present. No rhinorrhea.      Mouth/Throat:      Pharynx: Posterior oropharyngeal erythema present. No oropharyngeal exudate.      Comments: +post nasal  Cardiovascular:      Rate and Rhythm: Normal rate and regular rhythm.      Pulses: Normal pulses.      Heart sounds: Normal heart sounds.   Pulmonary:      Effort: Pulmonary effort is normal.      Breath sounds: Normal breath sounds.   Musculoskeletal:      Cervical back: Neck supple.   Lymphadenopathy:      Cervical: No cervical adenopathy.   Neurological:      Mental Status: She is alert and oriented to person, place, and time.          Assessment and Plan:    1. Upper respiratory tract infection, unspecified type    2. Acute cough         Upper respiratory tract infection, unspecified type  -     chlorpheniramine-phenyleph-DM 4-10-10 mg Tab; Take 1 tablet by mouth every 6 (six) hours as needed.  Dispense: 30 tablet; Refill: 0  -     amoxicillin-clavulanate 875-125mg " (AUGMENTIN) 875-125 mg per tablet; Take 1 tablet by mouth every 12 (twelve) hours.  Dispense: 20 tablet; Refill: 0  -     fluconazole (DIFLUCAN) 150 MG Tab; Take 1 tablet (150 mg total) by mouth every 72 hours as needed. May take after antibiotic complete. Repeat in 72 hr if needed  Dispense: 2 tablet; Refill: 0    Acute cough  -     POCT Influenza A/B      Flu-    There are no Patient Instructions on file for this visit.   Follow up if symptoms worsen or fail to improve.

## 2023-12-08 ENCOUNTER — PATIENT OUTREACH (OUTPATIENT)
Dept: ADMINISTRATIVE | Facility: HOSPITAL | Age: 48
End: 2023-12-08

## 2023-12-08 NOTE — PROGRESS NOTES
Population Health Chart Review & Patient Outreach Details    Per [employer] website, insurance is active and pt is listed on the attributed list needing a healthy you performed in   Pt needs appt for hy, sent to P[facility] to schedule via one note      Updates Requested / Reviewed:     []  Care Everywhere    []     []  External Sources ([facility], [facility], [facility], etc.)    [] [facility]   [] [facility]   [] Other:    []  Care Team Updated   []  Removed  or Duplicate Orders   []  Immunization Reconciliation Completed / Queried    [] [place]   [] [place]   [] [place]   [] [place]      Health Maintenance Topics Addressed and Outreach Outcomes / Actions Taken:             Breast Cancer Screening []  Mammogram Order Placed    []  Mammogram Screening Scheduled    []  External Records Requested & Care Team Updated if Applicable    []  External Records Uploaded & Care Team Updated if Applicable    []  Pt Declined Scheduling Mammogram    []  Pt Will Schedule with External Provider / Order Routed & Care Team Updated if Applicable              Cervical Cancer Screening []  Pap Smear Scheduled in Primary Care or OBGYN    []  External Records Requested & Care Team Updated if Applicable       []  External Records Uploaded, Care Team Updated, & History Updated if Applicable    []  Patient Declined Scheduling Pap Smear    []  Patient Will Schedule with External Provider & Care Team Updated if Applicable                  Colorectal Cancer Screening []  Colonoscopy Case Request / Referral / Home Test Order Placed    []  External Records Requested & Care Team Updated if Applicable    []  External Records Uploaded, Care Team Updated, & History Updated if Applicable    []  Patient Declined Completing Colon Cancer Screening    []  Patient Will Schedule with External Provider & Care Team Updated if Applicable    []  Fit Kit Mailed (add the SmartPhrase under additional notes)    []  Reminded Patient to Complete Home Test                 Diabetic Eye Exam []  Eye Exam Screening Order Placed    []  Eye Camera Scheduled or Optometry/Ophthalmology Referral Placed    []  External Records Requested & Care Team Updated if Applicable    []  External Records Uploaded, Care Team Updated, & History Updated if Applicable    []  Patient Declined Scheduling Eye Exam    []  Patient Will Schedule with External Provider & Care Team Updated if Applicable             Blood Pressure Control []  Primary Care Follow Up Visit Scheduled     []  Remote Blood Pressure Reading Captured    []  Patient Declined Remote Reading or Scheduling Appt - Escalated to PCP    []  Patient Will Call Back or Send Portal Message with Reading                 HbA1c & Other Labs []  Overdue Lab(s) Ordered    []  Overdue Lab(s) Scheduled    []  External Records Uploaded & Care Team Updated if Applicable    []  Primary Care Follow Up Visit Scheduled     []  Reminded Patient to Complete A1c Home Test    []  Patient Declined Scheduling Labs or Will Call Back to Schedule    []  Patient Will Schedule with External Provider / Order Routed, & Care Team Updated if Applicable           Primary Care Appointment []  Primary Care Appt Scheduled    []  Patient Declined Scheduling or Will Call Back to Schedule    []  Pt Established with External Provider, Updated Care Team, & Informed Pt to Notify Payor if Applicable           Medication Adherence /    Statin Use []  Primary Care Appointment Scheduled    []  Patient Reminded to  Prescription    []  Patient Declined, Provider Notified if Needed    []  Sent Provider Message to Review to Evaluate Pt for Statin, Add Exclusion Dx Codes, Document   Exclusion in Problem List, Change Statin Intensity Level to Moderate or High Intensity if Applicable                Osteoporosis Screening []  Dexa Order Placed    []  Dexa Appointment Scheduled    []  External Records Requested & Care Team Updated    []  External Records Uploaded, Care Team Updated, & History  Updated if Applicable    []  Patient Declined Scheduling Dexa or Will Call Back to Schedule    []  Patient Will Schedule with External Provider / Order Routed & Care Team Updated if Applicable       Additional Notes:

## 2023-12-26 ENCOUNTER — HOSPITAL ENCOUNTER (EMERGENCY)
Facility: HOSPITAL | Age: 48
Discharge: HOME | End: 2023-12-26
Attending: EMERGENCY MEDICINE
Payer: COMMERCIAL

## 2023-12-26 VITALS
TEMPERATURE: 98 F | HEIGHT: 67 IN | HEART RATE: 93 BPM | DIASTOLIC BLOOD PRESSURE: 82 MMHG | WEIGHT: 250 LBS | BODY MASS INDEX: 39.24 KG/M2 | RESPIRATION RATE: 18 BRPM | OXYGEN SATURATION: 99 % | SYSTOLIC BLOOD PRESSURE: 153 MMHG

## 2023-12-26 DIAGNOSIS — J02.9 PHARYNGITIS, UNSPECIFIED ETIOLOGY: ICD-10-CM

## 2023-12-26 DIAGNOSIS — J10.1 INFLUENZA A: Primary | ICD-10-CM

## 2023-12-26 DIAGNOSIS — J20.1 ACUTE BRONCHITIS DUE TO HAEMOPHILUS INFLUENZAE: ICD-10-CM

## 2023-12-26 LAB
FLUAV AG UPPER RESP QL IA.RAPID: POSITIVE
FLUBV AG UPPER RESP QL IA.RAPID: NEGATIVE
RAPID GROUP A STREP: NEGATIVE
SARS-COV+SARS-COV-2 AG RESP QL IA.RAPID: NEGATIVE

## 2023-12-26 PROCEDURE — 99284 EMERGENCY DEPT VISIT MOD MDM: CPT

## 2023-12-26 PROCEDURE — 99284 EMERGENCY DEPT VISIT MOD MDM: CPT | Mod: ,,, | Performed by: EMERGENCY MEDICINE

## 2023-12-26 PROCEDURE — 25000003 PHARM REV CODE 250: Performed by: EMERGENCY MEDICINE

## 2023-12-26 PROCEDURE — 87880 STREP A ASSAY W/OPTIC: CPT | Performed by: EMERGENCY MEDICINE

## 2023-12-26 PROCEDURE — 87428 SARSCOV & INF VIR A&B AG IA: CPT | Performed by: EMERGENCY MEDICINE

## 2023-12-26 RX ORDER — OSELTAMIVIR PHOSPHATE 75 MG/1
75 CAPSULE ORAL 2 TIMES DAILY
Qty: 10 CAPSULE | Refills: 0 | Status: SHIPPED | OUTPATIENT
Start: 2023-12-26 | End: 2023-12-31

## 2023-12-26 RX ORDER — ALBUTEROL SULFATE 90 UG/1
1-2 AEROSOL, METERED RESPIRATORY (INHALATION) EVERY 6 HOURS PRN
Qty: 18 G | Status: SHIPPED | OUTPATIENT
Start: 2023-12-26 | End: 2024-01-25

## 2023-12-26 RX ORDER — AZITHROMYCIN 250 MG/1
TABLET, FILM COATED ORAL
Qty: 6 TABLET | Refills: 0 | Status: SHIPPED | OUTPATIENT
Start: 2023-12-26 | End: 2023-12-31

## 2023-12-26 RX ORDER — ACETAMINOPHEN 500 MG
1000 TABLET ORAL
Status: COMPLETED | OUTPATIENT
Start: 2023-12-26 | End: 2023-12-26

## 2023-12-26 RX ADMIN — ACETAMINOPHEN 1000 MG: 500 TABLET ORAL at 12:12

## 2023-12-26 NOTE — Clinical Note
"Rox"Qamar Comer was seen and treated in our emergency department on 12/26/2023.  She may return to work on 01/02/2024.       If you have any questions or concerns, please don't hesitate to call.      Sarah Levy MD"
no

## 2023-12-26 NOTE — DISCHARGE INSTRUCTIONS
INCREASE REST AND FLUIDS   MEDICATIONS AS DIRECTED  OVER THE COUNTER  BENADRYL FOR CONGESTION  ROBITUSSIN FOR COUGH  TYLENOL FOR FEVER/PAIN

## 2024-01-23 ENCOUNTER — OFFICE VISIT (OUTPATIENT)
Dept: OBSTETRICS AND GYNECOLOGY | Facility: CLINIC | Age: 49
End: 2024-01-23
Payer: COMMERCIAL

## 2024-01-23 VITALS
BODY MASS INDEX: 41.59 KG/M2 | WEIGHT: 265 LBS | HEIGHT: 67 IN | DIASTOLIC BLOOD PRESSURE: 78 MMHG | SYSTOLIC BLOOD PRESSURE: 118 MMHG | HEART RATE: 101 BPM | RESPIRATION RATE: 19 BRPM | OXYGEN SATURATION: 99 %

## 2024-01-23 DIAGNOSIS — Z12.4 SCREENING FOR CERVICAL CANCER: ICD-10-CM

## 2024-01-23 DIAGNOSIS — Z01.419 NORMAL GYNECOLOGIC EXAMINATION: Primary | ICD-10-CM

## 2024-01-23 PROCEDURE — 99402 PREV MED CNSL INDIV APPRX 30: CPT | Mod: S$PBB,,, | Performed by: ADVANCED PRACTICE MIDWIFE

## 2024-01-23 PROCEDURE — 3008F BODY MASS INDEX DOCD: CPT | Mod: ,,, | Performed by: ADVANCED PRACTICE MIDWIFE

## 2024-01-23 PROCEDURE — 99214 OFFICE O/P EST MOD 30 MIN: CPT | Mod: PBBFAC | Performed by: ADVANCED PRACTICE MIDWIFE

## 2024-01-23 PROCEDURE — 1159F MED LIST DOCD IN RCRD: CPT | Mod: ,,, | Performed by: ADVANCED PRACTICE MIDWIFE

## 2024-01-23 PROCEDURE — 3078F DIAST BP <80 MM HG: CPT | Mod: ,,, | Performed by: ADVANCED PRACTICE MIDWIFE

## 2024-01-23 PROCEDURE — 3074F SYST BP LT 130 MM HG: CPT | Mod: ,,, | Performed by: ADVANCED PRACTICE MIDWIFE

## 2024-01-23 PROCEDURE — 88142 CYTOPATH C/V THIN LAYER: CPT | Mod: TC,GCY | Performed by: ADVANCED PRACTICE MIDWIFE

## 2024-01-23 PROCEDURE — 87624 HPV HI-RISK TYP POOLED RSLT: CPT | Mod: ,,, | Performed by: CLINICAL MEDICAL LABORATORY

## 2024-01-23 PROCEDURE — 88141 CYTOPATH C/V INTERPRET: CPT | Mod: ,,, | Performed by: PATHOLOGY

## 2024-01-23 NOTE — PROGRESS NOTES
Subjective     Patient ID: Rox Comer is a 48 y.o. female.    Chief Complaint: Gynecologic Exam (In for Annual Exam. C/o right side/back pain. Pt is unsure of last pap.//)    Pap collected today.   History of ablation some spotting occasionally.   Last MMG today  Colonoscopy UTD  C/o occasional constipation.       Gynecologic Exam      Review of Systems   Constitutional: Negative.    HENT: Negative.     Eyes: Negative.    Respiratory: Negative.     Cardiovascular: Negative.    Gastrointestinal: Negative.    Endocrine: Negative.    Genitourinary: Negative.    Musculoskeletal: Negative.    Integumentary:  Negative.   Allergic/Immunologic: Negative.    Neurological: Negative.    Psychiatric/Behavioral: Negative.            Objective     Physical Exam  Vitals reviewed. Exam conducted with a chaperone present.   Constitutional:       Appearance: Normal appearance.   HENT:      Head: Normocephalic.   Cardiovascular:      Rate and Rhythm: Normal rate and regular rhythm.   Pulmonary:      Effort: Pulmonary effort is normal.      Breath sounds: Normal breath sounds.   Chest:   Breasts:     Right: Normal. No mass.      Left: Normal. No mass.   Abdominal:      General: Abdomen is flat.      Palpations: Abdomen is soft.   Genitourinary:     General: Normal vulva.      Exam position: Lithotomy position.      Vagina: Normal.      Cervix: No cervical motion tenderness.      Uterus: Normal. Not tender.       Adnexa: Right adnexa normal and left adnexa normal.        Right: No mass.          Left: No mass.     Musculoskeletal:         General: Normal range of motion.      Cervical back: Normal range of motion.   Skin:     General: Skin is warm and dry.   Neurological:      Mental Status: She is alert and oriented to person, place, and time.   Psychiatric:         Mood and Affect: Mood normal.         Behavior: Behavior normal.            Assessment and Plan     1. Normal gynecologic examination    2. Screening for cervical  cancer  -     ThinPrep Pap Test        Discussed diet changes and stool softener daily          Follow up in about 1 year (around 1/23/2025), or if symptoms worsen or fail to improve, for Annual Exam.

## 2024-01-29 LAB
GH SERPL-MCNC: ABNORMAL NG/ML
INSULIN SERPL-ACNC: ABNORMAL U[IU]/ML
LAB AP CLINICAL INFORMATION: ABNORMAL
LAB AP GYN INTERPRETATION: ABNORMAL
LAB AP PAP DISCLAIMER COMMENTS: ABNORMAL
RENIN PLAS-CCNC: ABNORMAL NG/ML/H

## 2024-02-01 ENCOUNTER — PATIENT MESSAGE (OUTPATIENT)
Dept: OBSTETRICS AND GYNECOLOGY | Facility: CLINIC | Age: 49
End: 2024-02-01
Payer: COMMERCIAL

## 2024-02-01 LAB
HPV 16: NEGATIVE
HPV 18: NEGATIVE
HPV OTHER: NEGATIVE

## 2024-02-17 NOTE — ED PROVIDER NOTES
Encounter Date: 12/26/2023       History     Chief Complaint   Patient presents with    Cough    Fever    Vomiting    COVID-19 Concerns     PT IS A 57 YR OLD WITH CONGESTION, COUGH, FEVER AND N/V WITH COVID CONCERNS ONSET THIS WEEK.     Past Medical History    Diagnosis Date Comments  Hypertension [I10]    Hyperthyroidism [E05.90]    Sleep apnea, unspecified [G47.30]  Uses CPAP regularly  High cholesterol [E78.00]    Obesity, unspecified [E66.9]                Review of patient's allergies indicates:   Allergen Reactions    Cephalexin      Other reaction(s): Unknown    Cephalexin monohydrate Rash     Past Medical History:   Diagnosis Date    High cholesterol     Hypertension     Hyperthyroidism     Obesity, unspecified     Sleep apnea, unspecified     Uses CPAP regularly     Past Surgical History:   Procedure Laterality Date    HERNIA REPAIR      Left Inguinal, Sept 2019, Dr Acuña    KNEE SURGERY Left 2018    Miniscus Tear, Dr Blount    THYROID SURGERY  09/2020    total thyroidectomy, Dr Childress     Family History   Problem Relation Age of Onset    Diabetes Mother     Hypertension Mother     Heart disease Mother     Stroke Father     Cancer Father 69        Colon    Hypertension Father     Hypertension Sister     Cancer Other 50        Breast Cancer    Ovarian cancer Neg Hx      Social History     Tobacco Use    Smoking status: Never    Smokeless tobacco: Never   Substance Use Topics    Alcohol use: Never    Drug use: Never     Review of Systems   Constitutional:  Positive for activity change.   HENT:  Positive for congestion and sore throat.    Eyes: Negative.    Respiratory:  Positive for cough.    Cardiovascular: Negative.    Gastrointestinal:  Positive for nausea and vomiting.   Endocrine: Negative.    Musculoskeletal: Negative.    Skin: Negative.    Allergic/Immunologic: Positive for immunocompromised state (OBESITY).   Hematological: Negative.    Psychiatric/Behavioral: Negative.     All other systems reviewed  and are negative.      Physical Exam     Initial Vitals [12/26/23 1252]   BP Pulse Resp Temp SpO2   (!) 153/82 93 18 98.2 °F (36.8 °C) 99 %      MAP       --         Physical Exam    Nursing note and vitals reviewed.  Constitutional: She appears well-developed and well-nourished. She is cooperative. No distress.   HENT:   Head: Normocephalic and atraumatic.   Right Ear: External ear normal.   Left Ear: External ear normal.   Nose: Nose normal.   ERYTHEMA OF PHARYNX   Eyes: Conjunctivae and EOM are normal. Pupils are equal, round, and reactive to light.   Neck: Trachea normal. Neck supple.   Normal range of motion.  Cardiovascular:  Normal rate, regular rhythm, normal heart sounds and intact distal pulses.           Pulses:       Radial pulses are 3+ on the right side and 3+ on the left side.        Dorsalis pedis pulses are 3+ on the right side and 3+ on the left side.   Pulmonary/Chest: Breath sounds normal. No respiratory distress.   Abdominal: Abdomen is soft. Bowel sounds are normal. She exhibits no distension. There is no abdominal tenderness.   Musculoskeletal:         General: Normal range of motion.      Right shoulder: Normal.      Left shoulder: Normal.      Right upper arm: Normal.      Left upper arm: Normal.      Right elbow: Normal.      Left elbow: Normal.      Right forearm: Normal.      Left forearm: Normal.      Right wrist: Normal.      Left wrist: Normal.      Right hand: Normal.      Left hand: Normal.      Cervical back: Normal range of motion and neck supple.     Lymphadenopathy:     She has cervical adenopathy.     She has no axillary adenopathy.   Neurological: She is alert and oriented to person, place, and time. She has normal strength. No cranial nerve deficit or sensory deficit. She displays a negative Romberg sign. GCS eye subscore is 4. GCS verbal subscore is 5. GCS motor subscore is 6.   Reflex Scores:       Bicep reflexes are 2+ on the right side and 2+ on the left side.        Patellar reflexes are 2+ on the right side and 2+ on the left side.  Skin: Skin is warm and dry. Capillary refill takes less than 2 seconds.   Psychiatric: She has a normal mood and affect. Her speech is normal and behavior is normal. Judgment and thought content normal. Cognition and memory are normal.         Medical Screening Exam   See Full Note    ED Course   Procedures  Labs Reviewed   SARS-COV2 (COVID) W/ FLU ANTIGEN - Abnormal; Notable for the following components:       Result Value    Influenza A Positive (*)     All other components within normal limits    Narrative:     Negative SARS-CoV results should not be used as the sole basis for treatment or patient management decisions; negative results should be considered in the context of a patient's recent exposures, history and the presene of clinical signs and symptoms consistent with COVID-19.  Negative results should be treated as presumptive and confirmed by molecular assay, if necessary for patient management.   THROAT SCREEN, RAPID STREP - Normal          Imaging Results    None          Medications   acetaminophen tablet 1,000 mg (1,000 mg Oral Given 12/26/23 1257)     Medical Decision Making  PT IS A 57 YR OLD WITH CONGESTION, COUGH, FEVER AND N/V WITH COVID CONCERNS ONSET THIS WEEK.     Past Medical History    Diagnosis Date Comments  Hypertension [I10]    Hyperthyroidism [E05.90]    Sleep apnea, unspecified [G47.30]  Uses CPAP regularly  High cholesterol [E78.00]    Obesity, unspecified [E66.9]              Amount and/or Complexity of Data Reviewed  Labs: ordered.     Details: Viewed and Other Results - Labs    Updated   Order   12/26/23 1315  SARS-CoV2 (COVID) with FLU Antigen  Collected: 12/26/23 1300  Final result  Specimen: Respiratory from Nasopharyngeal      Influenza A Positive Abnormal   Influenza B Negative  COVID-19 Ag Negative       12/26/23 1313  Rapid strep screen  Collected: 12/26/23 1300  Final result  Specimen: Throat      Rapid  Group A Strep Negative           Discussion of management or test interpretation with external provider(s): EXAM  LABS POS FOR FLU A  DC HOME IN STABLE CONDITION        Risk  OTC drugs.  Prescription drug management.                                      Clinical Impression:   Final diagnoses:  [J10.1] Influenza A (Primary)  [J20.1] Acute bronchitis due to Haemophilus influenzae  [J02.9] Pharyngitis, unspecified etiology        ED Disposition Condition    Discharge Stable          ED Prescriptions       Medication Sig Dispense Start Date End Date Auth. Provider    oseltamivir (TAMIFLU) 75 MG capsule () Take 1 capsule (75 mg total) by mouth 2 (two) times daily. for 5 days 10 capsule 2023 Sarah Levy MD    azithromycin (Z-KYLER) 250 MG tablet () Take 2 tablets by mouth on day 1; Take 1 tablet by mouth on days 2-5 6 tablet 2023 Sarah Levy MD    albuterol (PROVENTIL/VENTOLIN HFA) 90 mcg/actuation inhaler () Inhale 1-2 puffs into the lungs every 6 (six) hours as needed for Wheezing. Rescue 18 g 2023 Sarah Levy MD          Follow-up Information       Follow up With Specialties Details Why Contact Info    Lula Noble MD Family Medicine In 1 week If symptoms worsen IF WORSE [address]  [facility] [place] [address]  [phone]               Sarah Levy MD  [phone]

## 2025-02-20 ENCOUNTER — OFFICE VISIT (OUTPATIENT)
Dept: OBSTETRICS AND GYNECOLOGY | Facility: CLINIC | Age: 50
End: 2025-02-20
Payer: COMMERCIAL

## 2025-02-20 VITALS
HEART RATE: 63 BPM | WEIGHT: 269 LBS | BODY MASS INDEX: 42.13 KG/M2 | DIASTOLIC BLOOD PRESSURE: 90 MMHG | SYSTOLIC BLOOD PRESSURE: 138 MMHG

## 2025-02-20 DIAGNOSIS — Z01.419 NORMAL GYNECOLOGIC EXAMINATION: Primary | ICD-10-CM

## 2025-02-20 DIAGNOSIS — Z12.4 SCREENING FOR CERVICAL CANCER: Primary | ICD-10-CM

## 2025-02-20 DIAGNOSIS — Z12.4 SCREENING FOR CERVICAL CANCER: ICD-10-CM

## 2025-02-20 DIAGNOSIS — R30.0 DYSURIA: ICD-10-CM

## 2025-02-20 DIAGNOSIS — Z87.42 HISTORY OF ABNORMAL CERVICAL PAP SMEAR: ICD-10-CM

## 2025-02-20 LAB
BILIRUB UR QL STRIP: NEGATIVE
CLARITY UR: CLEAR
COLOR UR: ABNORMAL
GLUCOSE UR STRIP-MCNC: NORMAL MG/DL
KETONES UR STRIP-SCNC: NEGATIVE MG/DL
LEUKOCYTE ESTERASE UR QL STRIP: NEGATIVE
MUCOUS, UA: ABNORMAL /LPF
NITRITE UR QL STRIP: NEGATIVE
PH UR STRIP: 6.5 PH UNITS
PROT UR QL STRIP: 10
RBC # UR STRIP: ABNORMAL /UL
RBC #/AREA URNS HPF: >182 /HPF
SP GR UR STRIP: 1.02
SQUAMOUS #/AREA URNS LPF: ABNORMAL /HPF
UROBILINOGEN UR STRIP-ACNC: NORMAL MG/DL
WBC #/AREA URNS HPF: 1 /HPF

## 2025-02-20 PROCEDURE — 99402 PREV MED CNSL INDIV APPRX 30: CPT | Mod: S$PBB,,, | Performed by: ADVANCED PRACTICE MIDWIFE

## 2025-02-20 PROCEDURE — 1159F MED LIST DOCD IN RCRD: CPT | Mod: ,,, | Performed by: ADVANCED PRACTICE MIDWIFE

## 2025-02-20 PROCEDURE — 99213 OFFICE O/P EST LOW 20 MIN: CPT | Mod: PBBFAC | Performed by: ADVANCED PRACTICE MIDWIFE

## 2025-02-20 PROCEDURE — 3008F BODY MASS INDEX DOCD: CPT | Mod: ,,, | Performed by: ADVANCED PRACTICE MIDWIFE

## 2025-02-20 PROCEDURE — 3080F DIAST BP >= 90 MM HG: CPT | Mod: ,,, | Performed by: ADVANCED PRACTICE MIDWIFE

## 2025-02-20 PROCEDURE — 88141 CYTOPATH C/V INTERPRET: CPT | Mod: ,,, | Performed by: PATHOLOGY

## 2025-02-20 PROCEDURE — 81001 URINALYSIS AUTO W/SCOPE: CPT | Mod: ,,, | Performed by: CLINICAL MEDICAL LABORATORY

## 2025-02-20 PROCEDURE — 88142 CYTOPATH C/V THIN LAYER: CPT | Mod: TC,GCY | Performed by: ADVANCED PRACTICE MIDWIFE

## 2025-02-20 PROCEDURE — 3075F SYST BP GE 130 - 139MM HG: CPT | Mod: ,,, | Performed by: ADVANCED PRACTICE MIDWIFE

## 2025-02-20 PROCEDURE — 87626 HPV SEP HI-RSK TYP&POOL RSLT: CPT | Mod: ,,, | Performed by: CLINICAL MEDICAL LABORATORY

## 2025-02-20 PROCEDURE — 99999 PR PBB SHADOW E&M-EST. PATIENT-LVL III: CPT | Mod: PBBFAC,,, | Performed by: ADVANCED PRACTICE MIDWIFE

## 2025-02-20 RX ORDER — MELOXICAM 7.5 MG/1
7.5 TABLET ORAL DAILY
COMMUNITY

## 2025-02-20 RX ORDER — GABAPENTIN 100 MG/1
100 CAPSULE ORAL 3 TIMES DAILY
COMMUNITY

## 2025-02-20 RX ORDER — HYDROCODONE BITARTRATE AND ACETAMINOPHEN 7.5; 325 MG/1; MG/1
1 TABLET ORAL EVERY 6 HOURS PRN
COMMUNITY

## 2025-02-20 NOTE — PROGRESS NOTES
Subjective     Patient ID: Rox Comer is a 49 y.o. female.    Chief Complaint: Well Woman (Last pap 01/2024 ASCUS )    Pap collected today  No cycles in 8 months and started light bleeding yesterday  History of ablation , was having 3 cycles per year  ASCUS pap 2024 Neg HPV  MMG UTD  Colonoscopy last year, 2 polyps repeat in 5 years        Review of Systems  Past Medical History:   Diagnosis Date    High cholesterol     Hypertension     Hyperthyroidism     Obesity, unspecified     Sleep apnea, unspecified     Uses CPAP regularly     Past Surgical History:   Procedure Laterality Date    HERNIA REPAIR      Left Inguinal, Sept 2019, Dr Acuña    KNEE SURGERY Left 2018    Miniscus Tear, Dr Blount    THYROID SURGERY  09/2020    total thyroidectomy, Dr Childress      OB History   No obstetric history on file.      Current Outpatient Medications   Medication Instructions    albuterol (PROVENTIL/VENTOLIN HFA) 90 mcg/actuation inhaler 1-2 puffs, Inhalation, Every 6 hours PRN, Rescue    amoxicillin-clavulanate 875-125mg (AUGMENTIN) 875-125 mg per tablet 1 tablet, Oral, Every 12 hours    aspirin (ECOTRIN) 81 MG EC tablet Take by mouth.    calcium carbonate/vitamin D3 (VITAMIN D-3 ORAL) 1 capsule    carvediloL (COREG) 12.5 MG tablet No dose, route, or frequency recorded.    chlorpheniramine-phenyleph-DM 4-10-10 mg Tab 1 tablet, Oral, Every 6 hours PRN    diclofenac (VOLTAREN) 75 MG EC tablet 1 tablet with food or milk    fluconazole (DIFLUCAN) 150 mg, Oral, Every 72 hours PRN, May take after antibiotic complete. Repeat in 72 hr if needed    gabapentin (NEURONTIN) 100 mg, Oral, 3 times daily    HYDROcodone-acetaminophen (NORCO) 7.5-325 mg per tablet 1 tablet, Oral, Every 6 hours PRN    meloxicam (MOBIC) 7.5 mg, Oral, Daily    rosuvastatin (CRESTOR) 20 MG tablet No dose, route, or frequency recorded.    SYNTHROID 150 mcg    triamterene-hydrochlorothiazide 75-50 mg (MAXZIDE) 75-50 mg per tablet Take by mouth.    valsartan  (DIOVAN) 80 MG tablet No dose, route, or frequency recorded.         Objective   Vitals:    02/20/25 1427   BP: (!) 138/90   Pulse: 63     Wt Readings from Last 2 Encounters:   02/20/25 122 kg (269 lb)   01/23/24 120.2 kg (265 lb)      Physical Exam     Assessment and Plan   1. Screening for cervical cancer  -     ThinPrep Pap Test    2. History of abnormal cervical Pap smear  -     ThinPrep Pap Test      ***       Follow up in about 1 year (around 2/20/2026), or if symptoms worsen or fail to improve, for Annual Exam.       Assessment and Plan   1. Normal gynecologic examination    2. Screening for cervical cancer  -     ThinPrep Pap Test  -     HPV DNA probe, amplified    3. History of abnormal cervical Pap smear  -     ThinPrep Pap Test  -     HPV DNA probe, amplified    4. Dysuria  -     Urinalysis, Reflex to Urine Culture  -     Urinalysis, Microscopic    Consider Ultrasound with continued irregular bleeding  Keep bleeding calendar  Patient was counseled today on Pap guidelines and recommendations for yearly pelvic exams, mammograms and monthly self breast awareness.  Counseled to see a PCP for other health maintenance.   Healthy diet and exercise encouraged.   Questions answered to desired level of satisfaction  Verbalized understanding to all information and instructions     Follow up in about 1 year (around 2/20/2026), or if symptoms worsen or fail to improve, for Annual Exam.

## 2025-03-01 LAB
HPV 16: NEGATIVE
HPV 18: NEGATIVE
HPV OTHER: POSITIVE

## 2025-03-03 ENCOUNTER — RESULTS FOLLOW-UP (OUTPATIENT)
Dept: OBSTETRICS AND GYNECOLOGY | Facility: CLINIC | Age: 50
End: 2025-03-03

## 2025-03-03 ENCOUNTER — TELEPHONE (OUTPATIENT)
Dept: OBSTETRICS AND GYNECOLOGY | Facility: CLINIC | Age: 50
End: 2025-03-03
Payer: COMMERCIAL

## 2025-03-03 DIAGNOSIS — R87.810 ASCUS WITH POSITIVE HIGH RISK HPV CERVICAL: Primary | ICD-10-CM

## 2025-03-03 DIAGNOSIS — R87.610 ASCUS WITH POSITIVE HIGH RISK HPV CERVICAL: Primary | ICD-10-CM

## 2025-03-18 ENCOUNTER — PROCEDURE VISIT (OUTPATIENT)
Dept: OBSTETRICS AND GYNECOLOGY | Facility: CLINIC | Age: 50
End: 2025-03-18
Payer: COMMERCIAL

## 2025-03-18 VITALS
SYSTOLIC BLOOD PRESSURE: 167 MMHG | WEIGHT: 264 LBS | BODY MASS INDEX: 41.35 KG/M2 | HEART RATE: 84 BPM | DIASTOLIC BLOOD PRESSURE: 88 MMHG

## 2025-03-18 DIAGNOSIS — R87.610 ASCUS WITH POSITIVE HIGH RISK HPV CERVICAL: ICD-10-CM

## 2025-03-18 DIAGNOSIS — G89.18 POST-OP PAIN: ICD-10-CM

## 2025-03-18 DIAGNOSIS — R87.810 ASCUS WITH POSITIVE HIGH RISK HPV CERVICAL: ICD-10-CM

## 2025-03-18 DIAGNOSIS — N89.8 VAGINAL DISCHARGE: ICD-10-CM

## 2025-03-18 DIAGNOSIS — Z01.818 PRE-OP TESTING: Primary | ICD-10-CM

## 2025-03-18 LAB
BACTERIAL VAGINOSIS DNA (OHS): POSITIVE
CANDIDA GLABRATA/KRUSEI DNA (OHS): NOT DETECTED
CANDIDA SPECIES DNA (OHS): NOT DETECTED
TRICHOMONAS VAGINALIS DNA (OHS): NOT DETECTED

## 2025-03-18 PROCEDURE — 88342 IMHCHEM/IMCYTCHM 1ST ANTB: CPT | Mod: TC,SUR | Performed by: OBSTETRICS & GYNECOLOGY

## 2025-03-18 PROCEDURE — 88305 TISSUE EXAM BY PATHOLOGIST: CPT | Mod: 26,,, | Performed by: PATHOLOGY

## 2025-03-18 PROCEDURE — 88342 IMHCHEM/IMCYTCHM 1ST ANTB: CPT | Mod: 26,,, | Performed by: PATHOLOGY

## 2025-03-18 PROCEDURE — 81515 NFCT DS BV&VAGINITIS DNA ALG: CPT | Mod: QW,,, | Performed by: CLINICAL MEDICAL LABORATORY

## 2025-03-18 PROCEDURE — 96372 THER/PROPH/DIAG INJ SC/IM: CPT | Mod: PBBFAC | Performed by: OBSTETRICS & GYNECOLOGY

## 2025-03-18 PROCEDURE — 99999PBSHW PR PBB SHADOW TECHNICAL ONLY FILED TO HB: Mod: PBBFAC,JZ,,

## 2025-03-18 RX ORDER — KETOROLAC TROMETHAMINE 30 MG/ML
60 INJECTION, SOLUTION INTRAMUSCULAR; INTRAVENOUS
Status: COMPLETED | OUTPATIENT
Start: 2025-03-18 | End: 2025-03-18

## 2025-03-18 RX ADMIN — KETOROLAC TROMETHAMINE 60 MG: 30 INJECTION, SOLUTION INTRAMUSCULAR at 04:03

## 2025-03-19 ENCOUNTER — RESULTS FOLLOW-UP (OUTPATIENT)
Dept: OBSTETRICS AND GYNECOLOGY | Facility: HOSPITAL | Age: 50
End: 2025-03-19

## 2025-03-19 DIAGNOSIS — N76.0 BACTERIAL VAGINOSIS: Primary | ICD-10-CM

## 2025-03-19 DIAGNOSIS — B96.89 BACTERIAL VAGINOSIS: Primary | ICD-10-CM

## 2025-03-19 RX ORDER — TINIDAZOLE 500 MG/1
1 TABLET ORAL 2 TIMES DAILY
Qty: 8 TABLET | Refills: 0 | Status: SHIPPED | OUTPATIENT
Start: 2025-03-19 | End: 2025-03-21

## 2025-03-20 ENCOUNTER — TELEPHONE (OUTPATIENT)
Dept: OBSTETRICS AND GYNECOLOGY | Facility: CLINIC | Age: 50
End: 2025-03-20
Payer: COMMERCIAL

## 2025-03-20 LAB
DHEA SERPL-MCNC: NORMAL
ESTROGEN SERPL-MCNC: NORMAL PG/ML
INSULIN SERPL-ACNC: NORMAL U[IU]/ML
LAB AP CLINICAL INFORMATION: NORMAL
LAB AP GROSS DESCRIPTION: NORMAL
LAB AP LABORATORY NOTES: NORMAL
T3RU NFR SERPL: NORMAL %

## 2025-03-20 NOTE — TELEPHONE ENCOUNTER
Spoke with pt about results and medications and the possibility of calling tomorrow with more results from her pathologu; pt voiced understanding.

## 2025-03-21 ENCOUNTER — TELEPHONE (OUTPATIENT)
Dept: OBSTETRICS AND GYNECOLOGY | Facility: CLINIC | Age: 50
End: 2025-03-21
Payer: COMMERCIAL